# Patient Record
Sex: MALE | Race: ASIAN
[De-identification: names, ages, dates, MRNs, and addresses within clinical notes are randomized per-mention and may not be internally consistent; named-entity substitution may affect disease eponyms.]

---

## 2020-11-12 ENCOUNTER — HOSPITAL ENCOUNTER (EMERGENCY)
Dept: HOSPITAL 72 - EMR | Age: 70
Discharge: ANOTHER HOSPITAL | End: 2020-11-12
Payer: COMMERCIAL

## 2020-11-12 VITALS — SYSTOLIC BLOOD PRESSURE: 130 MMHG | DIASTOLIC BLOOD PRESSURE: 80 MMHG

## 2020-11-12 VITALS — DIASTOLIC BLOOD PRESSURE: 80 MMHG | SYSTOLIC BLOOD PRESSURE: 130 MMHG

## 2020-11-12 VITALS — WEIGHT: 160 LBS | HEIGHT: 69 IN | BODY MASS INDEX: 23.7 KG/M2

## 2020-11-12 VITALS — DIASTOLIC BLOOD PRESSURE: 92 MMHG | SYSTOLIC BLOOD PRESSURE: 139 MMHG

## 2020-11-12 DIAGNOSIS — I25.10: ICD-10-CM

## 2020-11-12 DIAGNOSIS — I25.2: ICD-10-CM

## 2020-11-12 DIAGNOSIS — I10: ICD-10-CM

## 2020-11-12 DIAGNOSIS — I21.19: Primary | ICD-10-CM

## 2020-11-12 DIAGNOSIS — Z95.828: ICD-10-CM

## 2020-11-12 LAB
ADD MANUAL DIFF: NO
ALBUMIN SERPL-MCNC: 3.6 G/DL (ref 3.4–5)
ALBUMIN/GLOB SERPL: 1 {RATIO} (ref 1–2.7)
ALP SERPL-CCNC: 68 U/L (ref 46–116)
ALT SERPL-CCNC: 31 U/L (ref 12–78)
ANION GAP SERPL CALC-SCNC: 12 MMOL/L (ref 5–15)
APTT BLD: 27 SEC (ref 23–33)
AST SERPL-CCNC: 21 U/L (ref 15–37)
BASOPHILS NFR BLD AUTO: 1.1 % (ref 0–2)
BILIRUB SERPL-MCNC: 0.7 MG/DL (ref 0.2–1)
BUN SERPL-MCNC: 13 MG/DL (ref 7–18)
CALCIUM SERPL-MCNC: 8.5 MG/DL (ref 8.5–10.1)
CHLORIDE SERPL-SCNC: 101 MMOL/L (ref 98–107)
CO2 SERPL-SCNC: 23 MMOL/L (ref 21–32)
CREAT SERPL-MCNC: 1.2 MG/DL (ref 0.55–1.3)
EOSINOPHIL NFR BLD AUTO: 1.1 % (ref 0–3)
ERYTHROCYTE [DISTWIDTH] IN BLOOD BY AUTOMATED COUNT: 11.9 % (ref 11.6–14.8)
GLOBULIN SER-MCNC: 3.7 G/DL
HCT VFR BLD CALC: 47.4 % (ref 42–52)
HGB BLD-MCNC: 15.5 G/DL (ref 14.2–18)
INR PPP: 1 (ref 0.9–1.1)
LYMPHOCYTES NFR BLD AUTO: 42.9 % (ref 20–45)
MCV RBC AUTO: 94 FL (ref 80–99)
MONOCYTES NFR BLD AUTO: 6.1 % (ref 1–10)
NEUTROPHILS NFR BLD AUTO: 48.9 % (ref 45–75)
PLATELET # BLD: 219 K/UL (ref 150–450)
POTASSIUM SERPL-SCNC: 3.3 MMOL/L (ref 3.5–5.1)
RBC # BLD AUTO: 5.02 M/UL (ref 4.7–6.1)
SODIUM SERPL-SCNC: 136 MMOL/L (ref 136–145)
WBC # BLD AUTO: 6.1 K/UL (ref 4.8–10.8)

## 2020-11-12 PROCEDURE — 99285 EMERGENCY DEPT VISIT HI MDM: CPT

## 2020-11-12 PROCEDURE — 85730 THROMBOPLASTIN TIME PARTIAL: CPT

## 2020-11-12 PROCEDURE — 85610 PROTHROMBIN TIME: CPT

## 2020-11-12 PROCEDURE — 36415 COLL VENOUS BLD VENIPUNCTURE: CPT

## 2020-11-12 PROCEDURE — 93005 ELECTROCARDIOGRAM TRACING: CPT

## 2020-11-12 PROCEDURE — 84484 ASSAY OF TROPONIN QUANT: CPT

## 2020-11-12 PROCEDURE — 96374 THER/PROPH/DIAG INJ IV PUSH: CPT

## 2020-11-12 PROCEDURE — 80053 COMPREHEN METABOLIC PANEL: CPT

## 2020-11-12 PROCEDURE — 96372 THER/PROPH/DIAG INJ SC/IM: CPT

## 2020-11-12 PROCEDURE — 85025 COMPLETE CBC W/AUTO DIFF WBC: CPT

## 2020-11-12 NOTE — NUR
SPOKE TO letty at [facility] er  told her all the paperwork and lab results ha been 
faxed to [facility] er

## 2020-11-12 NOTE — NUR
meds given as orderd iv intact  patient is to be transferd to [facility] er as stemi

## 2020-11-12 NOTE — NUR
came to er complaints of chest pain which radiates to left arm  just like 
crushing hx of stent palcement

## 2020-11-12 NOTE — EMERGENCY ROOM REPORT
History of Present Illness


General


Chief Complaint:  Chest Pain


Source:  Patient





Present Illness


HPI


This patient states that about an hour prior to arrival he developed severe 

left-sided chest pain that is pressure-like.  He does have a history of MI.  He 

has 2 stents previously.  The last one being 2 years ago.  He states this was at

[facility].  He currently is not taking any medications.  He denies recent 

illness.  Denies fever or chills.  He denies cough or congestion.  He states 

this feels exactly the same as his previous myocardial infarction.


Allergies:  


Coded Allergies:  


     IODINE (Verified  Allergy, Unknown, 6/11/19)





COVID-19 Screening


Contact w/high risk pt:  No


Experienced COVID-19 symptoms?:  No


COVID-19 Testing performed PTA:  No





Patient History


Past Medical History:  see triage record, HTN, MI, CAD


Past Surgical History:  other - Stentsx2


Social History:  Denies: smoking, alcohol use, drug use


Reviewed Nursing Documentation:  PMH: Agreed; PSxH: Agreed





Nursing Documentation-PMH


Hx Cardiac Problems:  Yes - stentx 2





Review of Systems


All Other Systems:  negative except mentioned in HPI





Physical Exam





Vital Signs








  Date Time  Temp Pulse Resp B/P (MAP) Pulse Ox O2 Delivery O2 Flow Rate FiO2


 


11/12/20 12:17 97.0 82 18 130/80 (97) 98 Nasal Cannula  








Sp02 EP Interpretation:  reviewed, normal


General Appearance:  no apparent distress, alert, GCS 15, non-toxic


Head:  normocephalic, atraumatic


Eyes:  bilateral eye normal inspection, bilateral eye PERRL


ENT:  hearing grossly normal, normal pharynx, no angioedema, normal voice


Neck:  full range of motion, supple/symm/no masses


Respiratory:  chest non-tender, lungs clear, normal breath sounds, no 

respiratory distress, no retraction, no accessory muscle use, speaking full 

sentences


Cardiovascular #1:  regular rate, rhythm, no edema


Gastrointestinal:  normal bowel sounds, non tender, soft, non-distended, no 

guarding, no rebound


Rectal:  deferred


Musculoskeletal:  normal inspection, back normal, normal range of motion, non-

tender


Neurologic:  alert, motor strength/tone normal, oriented x3, sensory intact, 

responsive, speech normal


Psychiatric:  judgement/insight normal, memory normal, mood/affect normal, no 

suicidal/homicidal ideation


Skin:  no rash, diaphoresis





Medical Decision Making


Diagnostic Impression:  


   Primary Impression:  


   STEMI (ST elevation myocardial infarction)


ER Course


This patient is found to have an inferior STEMI on EKG.  [facility] is the STEMI 

receiving hospital contracted with [facility].  He was given 

aspirin, morphine and a heparin bolus and transferred to [facility] by ACLS for higher

level of care and for the need for emergency PCI.  I did not give nitroglycerin 

for concern of hypotension given that the STEMI is inferior and therefore is 

likely a right-sided MI.  The patient is transferred to [facility] for higher level of

care.





This patient is critically ill.  This patient required complex medical 

decision-making, aggressive intervention, extensive laboratory workup and 

monitoring.





Critical care time:  30 minutes.





Laboratory Tests








Test


 11/12/20


12:25


 


White Blood Count


 6.1 K/UL


(4.8-10.8)


 


Red Blood Count


 5.02 M/UL


(4.70-6.10)


 


Hemoglobin


 15.5 G/DL


(14.2-18.0)


 


Hematocrit


 47.4 %


(42.0-52.0)


 


Mean Corpuscular Volume 94 FL (80-99)  


 


Mean Corpuscular Hemoglobin


 30.9 PG


(27.0-31.0)


 


Mean Corpuscular Hemoglobin


Concent 32.7 G/DL


(32.0-36.0)


 


Red Cell Distribution Width


 11.9 %


(11.6-14.8)


 


Platelet Count


 219 K/UL


(150-450)


 


Mean Platelet Volume


 7.4 FL


(6.5-10.1)


 


Neutrophils (%) (Auto)


 48.9 %


(45.0-75.0)


 


Lymphocytes (%) (Auto)


 42.9 %


(20.0-45.0)


 


Monocytes (%) (Auto)


 6.1 %


(1.0-10.0)


 


Eosinophils (%) (Auto)


 1.1 %


(0.0-3.0)


 


Basophils (%) (Auto)


 1.1 %


(0.0-2.0)


 


Prothrombin Time


 10.9 SEC


(9.30-11.50)


 


Prothrombin Time INR 1.0 (0.9-1.1)  


 


Activated Partial


Thromboplast Time 27 SEC (23-33)





 


Sodium Level


 136 MMOL/L


(136-145)


 


Potassium Level


 3.3 MMOL/L


(3.5-5.1)  L


 


Chloride Level


 101 MMOL/L


()


 


Carbon Dioxide Level


 23 MMOL/L


(21-32)


 


Anion Gap


 12 mmol/L


(5-15)


 


Blood Urea Nitrogen


 13 mg/dL


(7-18)


 


Creatinine


 1.2 MG/DL


(0.55-1.30)


 


Estimated Glomerular


Filtration Rate 59.9 mL/min


(>60)


 


Glucose Level


 147 MG/DL


()  H


 


Calcium Level


 8.5 MG/DL


(8.5-10.1)


 


Total Bilirubin


 0.7 MG/DL


(0.2-1.0)


 


Aspartate Amino Transferase


(AST) 21 U/L (15-37)





 


Alanine Aminotransferase (ALT)


 31 U/L (12-78)





 


Alkaline Phosphatase


 68 U/L


()


 


Troponin I


 0.032 ng/mL


(0.000-0.056)


 


Total Protein


 7.3 G/DL


(6.4-8.2)


 


Albumin


 3.6 G/DL


(3.4-5.0)


 


Globulin 3.7 g/dL  


 


Albumin/Globulin Ratio 1.0 (1.0-2.7)  








EKG Diagnostic Results


Rate:  normal


Rhythm:  NSR


ST Segments:  other - ST elevations in II, III and aVF with reciprocal changes 

in V1, V2, V3





Rhythm Strip Diag. Results


EP Interpretation:  yes


Rate:  70's


Rhythm:  NSR, no PVC's, no ectopy





Last Vital Signs








  Date Time  Temp Pulse Resp B/P (MAP) Pulse Ox O2 Delivery O2 Flow Rate FiO2


 


11/12/20 12:17 97.0 82 18 130/80 (97) 98 Nasal Cannula  








Status:  improved


Disposition:  SHORT-TERM HOSP


Condition:  Critical











Rosie Mendez DO            Nov 12, 2020 12:41

## 2020-11-12 NOTE — NUR
report given to letty templeton at [facility] patient will be transferd to [facility] by ra 61  
o2 on continues